# Patient Record
Sex: FEMALE | Race: OTHER | NOT HISPANIC OR LATINO
[De-identification: names, ages, dates, MRNs, and addresses within clinical notes are randomized per-mention and may not be internally consistent; named-entity substitution may affect disease eponyms.]

---

## 2021-12-02 PROBLEM — Z00.00 ENCOUNTER FOR PREVENTIVE HEALTH EXAMINATION: Status: ACTIVE | Noted: 2021-12-02

## 2021-12-03 ENCOUNTER — APPOINTMENT (OUTPATIENT)
Dept: ENDOCRINOLOGY | Facility: CLINIC | Age: 31
End: 2021-12-03
Payer: COMMERCIAL

## 2021-12-03 ENCOUNTER — NON-APPOINTMENT (OUTPATIENT)
Age: 31
End: 2021-12-03

## 2021-12-03 VITALS
BODY MASS INDEX: 22.08 KG/M2 | TEMPERATURE: 98.3 F | OXYGEN SATURATION: 98 % | HEART RATE: 60 BPM | DIASTOLIC BLOOD PRESSURE: 76 MMHG | WEIGHT: 120 LBS | HEIGHT: 62 IN | SYSTOLIC BLOOD PRESSURE: 109 MMHG

## 2021-12-03 DIAGNOSIS — E89.0 POSTPROCEDURAL HYPOTHYROIDISM: ICD-10-CM

## 2021-12-03 DIAGNOSIS — Z78.9 OTHER SPECIFIED HEALTH STATUS: ICD-10-CM

## 2021-12-03 DIAGNOSIS — C71.6 MALIGNANT NEOPLASM OF CEREBELLUM: ICD-10-CM

## 2021-12-03 PROCEDURE — 36415 COLL VENOUS BLD VENIPUNCTURE: CPT

## 2021-12-03 PROCEDURE — 99202 OFFICE O/P NEW SF 15 MIN: CPT | Mod: 25

## 2021-12-07 ENCOUNTER — NON-APPOINTMENT (OUTPATIENT)
Age: 31
End: 2021-12-07

## 2021-12-07 PROBLEM — Z78.9 NON-SMOKER: Status: ACTIVE | Noted: 2021-12-07

## 2021-12-07 PROBLEM — C71.6: Status: ACTIVE | Noted: 2021-12-07

## 2021-12-07 PROBLEM — E89.0 POSTOPERATIVE HYPOTHYROIDISM: Status: ACTIVE | Noted: 2021-12-03

## 2021-12-07 LAB
T4 FREE SERPL-MCNC: 1.7 NG/DL
TSH SERPL-ACNC: 0.7 UIU/ML

## 2021-12-07 RX ORDER — LEVOTHYROXINE SODIUM 100 MCG
100 TABLET ORAL
Refills: 0 | Status: ACTIVE | COMMUNITY

## 2021-12-07 NOTE — PHYSICAL EXAM
[Alert] : alert [Well Nourished] : well nourished [No Acute Distress] : no acute distress [Well Developed] : well developed [Normal Voice/Communication] : normal voice communication [Normal Sclera/Conjunctiva] : normal sclera/conjunctiva [No Proptosis] : no proptosis [No Neck Mass] : no neck mass was observed [No LAD] : no lymphadenopathy [Supple] : the neck was supple [Well Healed Scar] : well healed scar [No Respiratory Distress] : no respiratory distress [Normal Rate] : heart rate was normal [Normal Affect] : the affect was normal [Normal Insight/Judgement] : insight and judgment were intact [Normal Mood] : the mood was normal

## 2021-12-07 NOTE — HISTORY OF PRESENT ILLNESS
[FreeTextEntry1] : CC: Hypothyroidism\par This is a 31-year-old female with postsurgical hypothyroidism, medulloblastoma, here for evaluation.\par She reports that she was found to have thyroid nodules in 2018 and had a total thyroidectomy.  Per patient thyroid nodules were benign.\par In 2017 she was diagnosed with medulloblastoma after she experienced dizziness.  She underwent surgery/RT/chemotherapy.  In May 2021 she had a recurrence of medulloblastoma and is currently undergoing chemotherapy.\par She is currently on levothyroxine 100 mcg daily.  She takes levothyroxine in the morning on an empty stomach.  She is not currently trying to conceive but would like to conceive in the future.\par She has 2 children aged 2-1/2 years old and 8 months.

## 2021-12-07 NOTE — REASON FOR VISIT
<<-----Click on this checkbox to enter Procedure Breast reconstruction with implants  09/11/2018  Prepec/ with ALLOLDERM  Active  TOYA [Initial Evaluation] : an initial evaluation [Hypothyroidism] : hypothyroidism

## 2021-12-07 NOTE — ASSESSMENT
[FreeTextEntry1] : This is a 31-year-old female with postsurgical hypothyroidism, medulloblastoma, here for evaluation.\par She reports that she was found to have thyroid nodules in 2018 and had a total thyroidectomy.  Per patient thyroid nodules were benign.\par She is currently on levothyroxine 100 mcg daily.  She takes levothyroxine in the morning on an empty stomach.  She is not currently trying to conceive but would like to conceive in the future.\par She is clinically euthyroid.\par Check TFTs.\par Continue levothyroxine 100 mcg daily pending results.  Increased thyroid hormone requirements during pregnancy reviewed.

## 2021-12-23 ENCOUNTER — APPOINTMENT (OUTPATIENT)
Dept: ENDOCRINOLOGY | Facility: CLINIC | Age: 31
End: 2021-12-23

## 2022-06-02 ENCOUNTER — APPOINTMENT (OUTPATIENT)
Dept: ENDOCRINOLOGY | Facility: CLINIC | Age: 32
End: 2022-06-02